# Patient Record
(demographics unavailable — no encounter records)

---

## 2025-02-26 NOTE — HISTORY OF PRESENT ILLNESS
[Right] : right hand dominant [FreeTextEntry1] : Patient returns for follow-up of recurrent right numbness and tingling.  Patient received a right carpal tunnel injection on September 5, 2024 which lasted for a 2.5 months  Patient also diagnosed with right middle and ring trigger fingers.  Send trigger fingers are minimally symptomatic

## 2025-02-26 NOTE — ASSESSMENT
[FreeTextEntry1] : Patient has failed conservative treatment of right carpal tunnel syndrome and would like to undergo endoscopic versus open carpal tunnel decompression/right middle and ring trigger finger injections  She also has middle and ring trigger fingers but they do not bother her and she wants to observe these.  She understands that the symptoms could recur and persist then need return to the OR for trigger finger decompression    The risks benefits and alternatives were discussed with the patient including, but not limited to:   infection, nerve injury, pillar pain, CRPS, anesthetic block injury, persistent symptoms, scar sensitivity, development of a trigger digit, recurrence, incomplete release, etc.  The patient would like to proceed with surgery.  Shared decision-making was undertaken

## 2025-02-26 NOTE — PHYSICAL EXAM
[de-identified] : Full strength of abductor pollicis brevis, negative Tinel's at carpal tunnel positive carpal tunnel compression test, positive Phalen's test.  Positive trigger fingers right middle and ring finger with triggering and locking when I test for a lift maneuver

## 2025-02-26 NOTE — PHYSICAL EXAM
[de-identified] : Full strength of abductor pollicis brevis, negative Tinel's at carpal tunnel positive carpal tunnel compression test, positive Phalen's test.  Positive trigger fingers right middle and ring finger with triggering and locking when I test for a lift maneuver

## 2025-05-13 NOTE — HISTORY OF PRESENT ILLNESS
[de-identified] : DOS: 3/19/25. [de-identified] : 7 weeks 5 days s/p Right endoscopic carpal tunnel decompression. Right middle finger trigger finger injection Right ring finger trigger finger injection. Patient is doing well and continues O/T twice a week including home exercises. [de-identified] : Incisions beautifully healed excellent range of motion no triggering or locking.  Excellent strength of abductor pollicis brevis [de-identified] : 7 weeks 5 days s/p Right endoscopic carpal tunnel decompression. Right middle finger trigger finger injection Right ring finger trigger finger injection [de-identified] : Patient doing remarkably well.  Will monitor for symptoms on the other side or additional trigger digits

## 2025-05-13 NOTE — HISTORY OF PRESENT ILLNESS
[de-identified] : DOS: 3/19/25. [de-identified] : 7 weeks 5 days s/p Right endoscopic carpal tunnel decompression. Right middle finger trigger finger injection Right ring finger trigger finger injection. Patient is doing well and continues O/T twice a week including home exercises. [de-identified] : Incisions beautifully healed excellent range of motion no triggering or locking.  Excellent strength of abductor pollicis brevis [de-identified] : 7 weeks 5 days s/p Right endoscopic carpal tunnel decompression. Right middle finger trigger finger injection Right ring finger trigger finger injection [de-identified] : Patient doing remarkably well.  Will monitor for symptoms on the other side or additional trigger digits

## 2025-07-28 NOTE — HISTORY OF PRESENT ILLNESS
[de-identified] : 74F presenting with left knee pain of two weeks. Has had some minor issues with the knee prior, however two weeks ago she nearly tripped over a pole on the ground and knee has hurt since. Improved. 5/10 on avg now, some days none. Taking no oral meds. Using CBD oil for pain as needed. Never had any other prior treatment to this knee. At this time she does not feel it interferes with ADLs to a great extent however stairs are difficult. Not using assistive devices. Denies numbness/parasthesias

## 2025-07-28 NOTE — DISCUSSION/SUMMARY
[de-identified] : I was present with the Fellow during the key portions of the history and exam. I discussed the case with the Fellow and agree with the findings and plan as documented in the [facility] note, unless otherwise noted below.    Left knee pain after the incident noted as above.  This may be an exacerbation of some underlying arthritis or soft tissue strain or sprain.  I cannot completely rule out internal derangement.  I would start with symptomatic treatments particularly since things have improved so much already.  We discussed appropriate activity modifications including strategies for reintroducing tennis.  We discussed appropriate medication use as well as discussing the precautions of both anti-inflammatories and acetaminophen.  We discussed the use of ice and heat.  I provided prescription for physical therapy.  I am optimistic that things will improve.  Will follow-up in 2 months to gauge response but there is any significant change in the interim or development mechanical symptoms she will not hesitate to contact us.  Patient is in agreement this plan.  All questions answered.